# Patient Record
Sex: FEMALE | URBAN - METROPOLITAN AREA
[De-identification: names, ages, dates, MRNs, and addresses within clinical notes are randomized per-mention and may not be internally consistent; named-entity substitution may affect disease eponyms.]

---

## 2021-03-22 ENCOUNTER — OFFICE VISIT - HEALTHEAST (OUTPATIENT)
Dept: FAMILY MEDICINE | Facility: CLINIC | Age: 46
End: 2021-03-22

## 2021-03-22 DIAGNOSIS — R10.31 ABDOMINAL PAIN, RIGHT LOWER QUADRANT: ICD-10-CM

## 2021-03-22 DIAGNOSIS — E03.9 ACQUIRED HYPOTHYROIDISM: ICD-10-CM

## 2021-03-22 LAB
ALBUMIN SERPL-MCNC: 4 G/DL (ref 3.5–5)
ALP SERPL-CCNC: 78 U/L (ref 45–120)
ALT SERPL W P-5'-P-CCNC: 27 U/L (ref 0–45)
ANION GAP SERPL CALCULATED.3IONS-SCNC: 11 MMOL/L (ref 5–18)
AST SERPL W P-5'-P-CCNC: 22 U/L (ref 0–40)
BASOPHILS # BLD AUTO: 0.1 THOU/UL (ref 0–0.2)
BASOPHILS NFR BLD AUTO: 1 % (ref 0–2)
BILIRUB SERPL-MCNC: 0.2 MG/DL (ref 0–1)
BUN SERPL-MCNC: 16 MG/DL (ref 8–22)
CALCIUM SERPL-MCNC: 9.4 MG/DL (ref 8.5–10.5)
CHLORIDE BLD-SCNC: 107 MMOL/L (ref 98–107)
CO2 SERPL-SCNC: 22 MMOL/L (ref 22–31)
CREAT SERPL-MCNC: 0.88 MG/DL (ref 0.6–1.1)
EOSINOPHIL # BLD AUTO: 0.4 THOU/UL (ref 0–0.4)
EOSINOPHIL NFR BLD AUTO: 6 % (ref 0–6)
ERYTHROCYTE [DISTWIDTH] IN BLOOD BY AUTOMATED COUNT: 12.2 % (ref 11–14.5)
GFR SERPL CREATININE-BSD FRML MDRD: >60 ML/MIN/1.73M2
GLUCOSE BLD-MCNC: 81 MG/DL (ref 70–125)
HCT VFR BLD AUTO: 38.3 % (ref 35–47)
HGB BLD-MCNC: 12.9 G/DL (ref 12–16)
IMM GRANULOCYTES # BLD: 0 THOU/UL
IMM GRANULOCYTES NFR BLD: 0 %
LYMPHOCYTES # BLD AUTO: 2.5 THOU/UL (ref 0.8–4.4)
LYMPHOCYTES NFR BLD AUTO: 34 % (ref 20–40)
MCH RBC QN AUTO: 32 PG (ref 27–34)
MCHC RBC AUTO-ENTMCNC: 33.7 G/DL (ref 32–36)
MCV RBC AUTO: 95 FL (ref 80–100)
MONOCYTES # BLD AUTO: 0.4 THOU/UL (ref 0–0.9)
MONOCYTES NFR BLD AUTO: 6 % (ref 2–10)
NEUTROPHILS # BLD AUTO: 3.9 THOU/UL (ref 2–7.7)
NEUTROPHILS NFR BLD AUTO: 54 % (ref 50–70)
PLATELET # BLD AUTO: 372 THOU/UL (ref 140–440)
PMV BLD AUTO: 9.8 FL (ref 7–10)
POTASSIUM BLD-SCNC: 4.2 MMOL/L (ref 3.5–5)
PROT SERPL-MCNC: 7.4 G/DL (ref 6–8)
RBC # BLD AUTO: 4.03 MILL/UL (ref 3.8–5.4)
SODIUM SERPL-SCNC: 140 MMOL/L (ref 136–145)
TSH SERPL DL<=0.005 MIU/L-ACNC: 3.15 UIU/ML (ref 0.3–5)
WBC: 7.3 THOU/UL (ref 4–11)

## 2021-03-22 RX ORDER — LEVONORGESTREL AND ETHINYL ESTRADIOL 0.1-0.02MG
1 KIT ORAL DAILY
Status: SHIPPED | COMMUNITY
Start: 2021-02-02

## 2021-03-22 RX ORDER — LEVOTHYROXINE SODIUM 125 UG/1
TABLET ORAL
Status: SHIPPED | COMMUNITY
Start: 2021-01-19

## 2021-03-22 ASSESSMENT — MIFFLIN-ST. JEOR: SCORE: 1385.67

## 2021-03-24 ENCOUNTER — COMMUNICATION - HEALTHEAST (OUTPATIENT)
Dept: FAMILY MEDICINE | Facility: CLINIC | Age: 46
End: 2021-03-24

## 2021-04-19 ENCOUNTER — HOSPITAL ENCOUNTER (OUTPATIENT)
Dept: CT IMAGING | Facility: CLINIC | Age: 46
Discharge: HOME | End: 2021-04-19
Attending: FAMILY MEDICINE

## 2021-04-19 DIAGNOSIS — R10.31 ABDOMINAL PAIN, RIGHT LOWER QUADRANT: ICD-10-CM

## 2021-05-07 ENCOUNTER — COMMUNICATION - HEALTHEAST (OUTPATIENT)
Dept: TELEHEALTH | Facility: CLINIC | Age: 46
End: 2021-05-07

## 2021-06-07 ENCOUNTER — OFFICE VISIT - HEALTHEAST (OUTPATIENT)
Dept: FAMILY MEDICINE | Facility: CLINIC | Age: 46
End: 2021-06-07

## 2021-06-07 DIAGNOSIS — R10.31 ABDOMINAL PAIN, RIGHT LOWER QUADRANT: ICD-10-CM

## 2021-06-22 ENCOUNTER — OFFICE VISIT - HEALTHEAST (OUTPATIENT)
Dept: SURGERY | Facility: CLINIC | Age: 46
End: 2021-06-22

## 2021-06-22 DIAGNOSIS — R10.31 RLQ ABDOMINAL PAIN: ICD-10-CM

## 2021-06-25 ENCOUNTER — COMMUNICATION - HEALTHEAST (OUTPATIENT)
Dept: SURGERY | Facility: CLINIC | Age: 46
End: 2021-06-25

## 2021-07-27 VITALS
WEIGHT: 173.4 LBS | SYSTOLIC BLOOD PRESSURE: 110 MMHG | BODY MASS INDEX: 30.79 KG/M2 | BODY MASS INDEX: 30.72 KG/M2 | OXYGEN SATURATION: 98 % | TEMPERATURE: 98.2 F | SYSTOLIC BLOOD PRESSURE: 104 MMHG | WEIGHT: 173.8 LBS | HEIGHT: 63 IN | BODY MASS INDEX: 29.94 KG/M2 | TEMPERATURE: 98.4 F | OXYGEN SATURATION: 98 % | SYSTOLIC BLOOD PRESSURE: 118 MMHG | DIASTOLIC BLOOD PRESSURE: 64 MMHG | HEART RATE: 80 BPM | HEART RATE: 83 BPM | WEIGHT: 169 LBS | DIASTOLIC BLOOD PRESSURE: 74 MMHG | DIASTOLIC BLOOD PRESSURE: 66 MMHG

## 2021-07-27 PROBLEM — E03.9 ACQUIRED HYPOTHYROIDISM: Status: ACTIVE | Noted: 2021-03-23

## 2021-07-27 NOTE — TELEPHONE ENCOUNTER
Left a voicemail for Carly rhea through  to schedule surgery with DR. Wong. Mentioned that July 27th is the first available. Provided direct line for patient to call when ready to schedule. The  also left the  line to call first if she needs to.    Patricia GALE  Surgery Scheduler  [facility]  Surgery [facility]  Direct line: [phone]   Main Line: [phone]  Direct Fax: [phone]
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
## 2021-07-27 NOTE — PROGRESS NOTES
"    Assessment & Plan     Abdominal pain, right lower quadrant    Unclear to me what is the actual cause of this discomfort.  We reviewed her recent pelvic ultrasound which was normal.  We can get a CT scan just looking for kidney stones or any other abdominal pathology that was not evident on the ultrasound.  We also did some blood work today looking into this and try to see if we can find something that might be causing her issues.  We will follow-up with her after the results are back and we can come up with a plan from there.  If she is not getting better, and there is no obvious cause on the scans, then I might consider sending her to GI or OB/GYN to further try to figure out why she is having such discomfort.      - CT Abdomen Pelvis With Oral With IV Contrast; Future  - HM1(CBC and Differential)  - Comprehensive Metabolic Panel  - Thyroid Stimulating Hormone (TSH)    Acquired hypothyroidism    We will check a TSH today just to make sure that she is doing well and she has not had this checked in a file.          Review of external notes as documented in note  Review of the result(s) of each unique test - labs and CT ordered today  Independent interpretation of a test performed by another physician/other qualified health care professional (not separately reported) - reviewed the pelvic US she had done in 2020  Discussion of management or test interpretation with external physician/other qualified healthcare professional/appropriate source -     [id number]}     BMI:   Estimated body mass index is 30.72 kg/m  as calculated from the following:    Height as of this encounter: 5' 3\" (1.6 m).    Weight as of this encounter: 173 lb 6.4 oz (78.7 kg).       No follow-ups on file.    Mj Cheatham MD  [facility]   Carly Christie is 45 y.o. and presents today for the following health issues   HPI     45-year-old female new patient to our clinic who is here today for " "consultation in regards to some abdominal and pelvic discomfort that she has been having now for almost a year.  This visit is done via a  of average quality.  She has had low abdominal and pelvic pain in the suprapubic area and then sometimes radiating to the right lower quadrant around to the right flank for the last year.  She had a pelvic ultrasound done at the [facility] last year which was normal and we did spend time reviewing that today.  She continues to have episodic cramping and pain in the above-mentioned areas.  She has normal menstrual cycles and does not think this relates at all to her menstrual cycle.  She has not had any abnormal bleeding.  She is on birth control pills.  She has not had any hematuria or painful urination.  She has had no fevers or chills or night sweats or weight loss.  Pain is not getting worse but is certainly not getting any better.    Review of Systems    A comprehensive Review of Systems was performed, and other than the positives mentioned above, the ROS was negative.         Objective    /74 (Patient Site: Left Arm, Patient Position: Sitting, Cuff Size: Adult Large)   Pulse 83   Temp 98.4  F (36.9  C) (Oral)   Ht 5' 3\" (1.6 m)   Wt 173 lb 6.4 oz (78.7 kg)   LMP 03/19/2021   SpO2 98%   BMI 30.72 kg/m    Body mass index is 30.72 kg/m .  Physical Exam    Well-appearing female in no acute distress.  Vital signs as noted.  Chest clear to auscultation.  Heart regular rate and rhythm.  No CVA tenderness.  Abdominal exam shows some tenderness in the suprapubic and lower right quadrant area but no guarding or rebound is present.              "

## 2021-07-27 NOTE — LETTER
Letter by Mj Cheatham MD at      Author: Mj Cheatham MD Service: -- Author Type: --    Filed:  Encounter Date: 3/24/2021 Status: (Other)         Carly Christie  [address] [place] [address]             March 24, 2021         Dear Ms. Christie,    Below are the results from your recent visit:    Resulted Orders   Comprehensive Metabolic Panel   Result Value Ref Range    Sodium 140 136 - 145 mmol/L    Potassium 4.2 3.5 - 5.0 mmol/L    Chloride 107 98 - 107 mmol/L    CO2 22 22 - 31 mmol/L    Anion Gap, Calculation 11 5 - 18 mmol/L    Glucose 81 70 - 125 mg/dL    BUN 16 8 - 22 mg/dL    Creatinine 0.88 0.60 - 1.10 mg/dL    GFR MDRD Af Amer >60 >60 mL/min/1.73m2    GFR MDRD Non Af Amer >60 >60 mL/min/1.73m2    Bilirubin, Total 0.2 0.0 - 1.0 mg/dL    Calcium 9.4 8.5 - 10.5 mg/dL    Protein, Total 7.4 6.0 - 8.0 g/dL    Albumin 4.0 3.5 - 5.0 g/dL    Alkaline Phosphatase 78 45 - 120 U/L    AST 22 0 - 40 U/L    ALT 27 0 - 45 U/L    Narrative    Fasting Glucose reference range is 70-99 mg/dL per  American Diabetes Association (ADA) guidelines.   Thyroid Stimulating Hormone (TSH)   Result Value Ref Range    TSH 3.15 0.30 - 5.00 uIU/mL   HM1 (CBC with Diff)   Result Value Ref Range    WBC 7.3 4.0 - 11.0 thou/uL    RBC 4.03 3.80 - 5.40 mill/uL    Hemoglobin 12.9 12.0 - 16.0 g/dL    Hematocrit 38.3 35.0 - 47.0 %    MCV 95 80 - 100 fL    MCH 32.0 27.0 - 34.0 pg    MCHC 33.7 32.0 - 36.0 g/dL    RDW 12.2 11.0 - 14.5 %    Platelets 372 140 - 440 thou/uL    MPV 9.8 7.0 - 10.0 fL    Neutrophils % 54 50 - 70 %    Lymphocytes % 34 20 - 40 %    Monocytes % 6 2 - 10 %    Eosinophils % 6 0 - 6 %    Basophils % 1 0 - 2 %    Immature Granulocyte % 0 <=0 %    Neutrophils Absolute 3.9 2.0 - 7.7 thou/uL    Lymphocytes Absolute 2.5 0.8 - 4.4 thou/uL    Monocytes Absolute 0.4 0.0 - 0.9 thou/uL    Eosinophils Absolute 0.4 0.0 - 0.4 thou/uL    Basophils Absolute 0.1 0.0 - 0.2 thou/uL    Immature Granulocyte Absolute 0.0  <=0.0 thou/uL       Thyroid test is ok     Your complete metabolic panel is good.  Your blood sugars are normal, and your kidney function tests and liver function tests are in a normal range.     Blood counts are normal    Please call with questions or contact us using [employer].    Sincerely,        Electronically signed by Mj Cheatham MD

## 2021-07-27 NOTE — PROGRESS NOTES
"    Assessment & Plan     Abdominal pain, right lower quadrant    Since her pain has been constantly in this right lower quadrant and she does have this dilated appendix, I Myrtle send her to our general surgery team just to see if they think that this might have something to do with her subacute to chronic pain that she has been having.  There was a bit of a miscommunication in the CT report as the radiologist thought that it was in her left lower quadrant the pain, but it actually was always in the right lower quadrant and not with this abnormality seen, I just wonder if you might have something to do with that, so we will send them to our esteemed colleagues over at surgery and see if they think it might be worth either taking out or treating somehow.      - Ambulatory referral to General Surgery    Review of the result(s) of each unique test - CT done in april 2021  Independent interpretation of a test performed by another physician/other qualified health care professional (not separately reported) - CT  20 minutes spent on the date of the encounter doing chart review, review of test results, interpretation of tests, patient visit and documentation        BMI:   Estimated body mass index is 30.79 kg/m  as calculated from the following:    Height as of 3/22/21: 5' 3\" (1.6 m).    Weight as of this encounter: 173 lb 12.8 oz (78.8 kg).       No follow-ups on file.    Mj Cheatham MD  [facility]   Carly Christie is 45 y.o. and presents today for the following health issues   HPI     Patient is here today with continuing issues with right lower quadrant pain.  She has some pain that is not terribly severe, but is constantly there now, and we did a CT scan back in April which evidently she did not get a whole lot of communication on the results.  She continues to have pain in this right lower quadrant which she describes as frequent and crampy and about a 5 out of 10 " most times.  She has no bowel changes and no diarrhea no constipation.  No nausea or vomiting.  No fevers or chills or weight loss.  Review of Systems          Objective    /66 (Patient Site: Left Arm, Patient Position: Sitting, Cuff Size: Adult Regular)   Pulse 80   Temp 98.2  F (36.8  C) (Oral)   Wt 173 lb 12.8 oz (78.8 kg)   LMP 05/24/2021   SpO2 98%   BMI 30.79 kg/m    Body mass index is 30.79 kg/m .  Physical Exam  Well-appearing female in no acute distress.  Vital signs as noted.  She is tender to palpation in that right lower quadrant but no guarding or rebound is present.  Bowel sounds are present.